# Patient Record
Sex: FEMALE | Race: WHITE | Employment: FULL TIME | URBAN - METROPOLITAN AREA
[De-identification: names, ages, dates, MRNs, and addresses within clinical notes are randomized per-mention and may not be internally consistent; named-entity substitution may affect disease eponyms.]

---

## 2024-09-04 ENCOUNTER — INITIAL PRENATAL (OUTPATIENT)
Dept: OBGYN CLINIC | Age: 39
End: 2024-09-04
Payer: COMMERCIAL

## 2024-09-04 ENCOUNTER — ANCILLARY PROCEDURE (OUTPATIENT)
Dept: OBGYN CLINIC | Age: 39
End: 2024-09-04
Payer: COMMERCIAL

## 2024-09-04 VITALS
HEIGHT: 66 IN | WEIGHT: 186 LBS | HEART RATE: 110 BPM | SYSTOLIC BLOOD PRESSURE: 117 MMHG | BODY MASS INDEX: 29.89 KG/M2 | DIASTOLIC BLOOD PRESSURE: 79 MMHG

## 2024-09-04 DIAGNOSIS — Z98.891 HX OF CESAREAN SECTION: ICD-10-CM

## 2024-09-04 DIAGNOSIS — O09.892 HX OF PRETERM DELIVERY, CURRENTLY PREGNANT, SECOND TRIMESTER: ICD-10-CM

## 2024-09-04 DIAGNOSIS — B18.2 CHRONIC HEPATITIS C WITHOUT HEPATIC COMA (HCC): ICD-10-CM

## 2024-09-04 DIAGNOSIS — F17.200 SMOKER: ICD-10-CM

## 2024-09-04 DIAGNOSIS — Z87.59 HISTORY OF PLACENTA ABRUPTION: ICD-10-CM

## 2024-09-04 DIAGNOSIS — O09.522 MULTIGRAVIDA OF ADVANCED MATERNAL AGE IN SECOND TRIMESTER: Primary | ICD-10-CM

## 2024-09-04 DIAGNOSIS — Z34.90 FIFTH PREGNANCY: ICD-10-CM

## 2024-09-04 PROBLEM — K81.0 ACUTE CHOLECYSTITIS: Status: RESOLVED | Noted: 2020-10-21 | Resolved: 2024-09-04

## 2024-09-04 LAB
GLUCOSE URINE, POC: NEGATIVE MG/DL
PROTEIN UA: NEGATIVE

## 2024-09-04 PROCEDURE — 81002 URINALYSIS NONAUTO W/O SCOPE: CPT | Performed by: OBSTETRICS & GYNECOLOGY

## 2024-09-04 PROCEDURE — 76805 OB US >/= 14 WKS SNGL FETUS: CPT | Performed by: OBSTETRICS & GYNECOLOGY

## 2024-09-04 PROCEDURE — 99204 OFFICE O/P NEW MOD 45 MIN: CPT | Performed by: OBSTETRICS & GYNECOLOGY

## 2024-09-04 PROCEDURE — 99203 OFFICE O/P NEW LOW 30 MIN: CPT | Performed by: OBSTETRICS & GYNECOLOGY

## 2024-09-04 RX ORDER — PHENOL 1.4 %
325 AEROSOL, SPRAY (ML) MUCOUS MEMBRANE DAILY
COMMUNITY
Start: 2024-08-13 | End: 2025-08-13

## 2024-09-04 NOTE — PROGRESS NOTES
Fiona Glass presents to office today for new patient consult.   Patient denies bleeding, LOF, or contractions.

## 2024-09-04 NOTE — PROGRESS NOTES
[facility] SPECIALTY CARE [facility] [place], [place] MATERNAL FETAL MEDICINE  [address] [place] [id number]  Dept: [phone]  Loc: [phone]     2024    RE:  Fiona Glass     : 1985   AGE: 39 y.o.     Dear Dr. Paulson,    Thank you for allowing me to provide prenatal consultation for Fiona Glass.  As I'm sure you will recall, Fiona Glass is a 39 y.o. [id number] LMP recorded (approximate). Patient is pregnant. Estimated Date of Delivery: 25 at 16w6d seen in our office today for the following:    REASON FOR CONSULTATION:  Patient Active Problem List    Diagnosis Date Noted    Multigravida of advanced maternal age in second trimester 2024    Fifth pregnancy 2024    Hx of  delivery, currently pregnant, second trimester 2024    Hx of  section 2024    Chronic hepatitis C without hepatic coma (HCC) 2024    Smoker 2024    History of placenta abruption 2024        PAST HISTORY:  OB History    Para Term  AB Living   5 2 1 1 2 2   SAB IAB Ectopic Molar Multiple Live Births   2       0 2      # Outcome Date GA Lbr Brayan/2nd Weight Sex Type Anes PTL Lv   5 Current            4  16 36w2d  2.381 kg (5 lb 4 oz) M CS-LTranv Spinal N FLORENCIA      Complications: Abruptio Placenta   3 Term 11 41w0d  4.678 kg (10 lb 5 oz) M CS-LTranv Spinal, EPI N FLORENCIA   2 SAB            1 SAB                   MEDICAL:  Past Medical History:   Diagnosis Date    Abnormal Pap smear of cervix 2024    Acute cholecystitis 10/21/2020    Depression     Infections of genitourinary tract antepartum 10/19/2011    Liveborn by  2016    Liver disease      delivery 2016    Trauma         SURGICAL:  Past Surgical History:   Procedure Laterality Date     SECTION  2011     SECTION  2016    CHOLECYSTECTOMY, LAPAROSCOPIC N/A 10/22/2020    CHOLECYSTECTOMY LAPAROSCOPIC WITH IOC

## 2024-09-04 NOTE — PATIENT INSTRUCTIONS
Patient Education        Weeks 14 to 18 of Your Pregnancy: Care Instructions  Around this time, you may start to look pregnant. Your baby is now able to pass urine. And the first stool (meconium) is starting to collect in your baby's intestines. Hair is starting to grow on your baby's head.    You may notice some skin changes, such as itchy spots on your palms or acne on your face.   At your next doctor visit, you may have an ultrasound. So you might think about whether you want to know the sex of your baby. Also ask your doctor about flu and COVID-19 shots.      How to reduce stress   Ask for help when you need it.  Try to avoid things that cause you stress.  Seek out things that relieve stress, such as breathing exercises or yoga.     How to get exercise   If you don't usually exercise, start slowly. Short walks may be a good choice.  Try to be active 30 minutes a day, at least 5 days a week.  Avoid activities where you're more likely to fall.  Use light weights to reduce stress on your joints.     How to stay at a healthy weight for you   Talk to your doctor or midwife about how much weight you should gain.  It's generally best to gain:  About 28 to 40 pounds if you're underweight.  About 25 to 35 pounds if you're at a healthy weight.  About 15 to 25 pounds if you're overweight.  About 11 to 20 pounds if you're very overweight (obese).  Follow-up care is a key part of your treatment and safety. Be sure to make and go to all appointments, and call your doctor if you are having problems. It's also a good idea to know your test results and keep a list of the medicines you take.  Where can you learn more?  Go to https://www.[employer].net/patientEd and enter I453 to learn more about \"Weeks 14 to 18 of Your Pregnancy: Care Instructions.\"  Current as of: July 10, 2023  Content Version: 14.1  © 0756-8722 [employer]wise, [employer].   Care instructions adapted under license by [employer]. If you have questions about a

## 2024-09-30 ENCOUNTER — ANCILLARY PROCEDURE (OUTPATIENT)
Dept: OBGYN CLINIC | Age: 39
End: 2024-09-30
Payer: COMMERCIAL

## 2024-09-30 ENCOUNTER — ROUTINE PRENATAL (OUTPATIENT)
Dept: OBGYN CLINIC | Age: 39
End: 2024-09-30
Payer: COMMERCIAL

## 2024-09-30 VITALS — WEIGHT: 185 LBS | SYSTOLIC BLOOD PRESSURE: 112 MMHG | DIASTOLIC BLOOD PRESSURE: 62 MMHG | BODY MASS INDEX: 29.86 KG/M2

## 2024-09-30 DIAGNOSIS — O44.02 PLACENTA PREVIA IN SECOND TRIMESTER: ICD-10-CM

## 2024-09-30 DIAGNOSIS — O09.522 MULTIGRAVIDA OF ADVANCED MATERNAL AGE IN SECOND TRIMESTER: Primary | ICD-10-CM

## 2024-09-30 DIAGNOSIS — Z3A.20 20 WEEKS GESTATION OF PREGNANCY: ICD-10-CM

## 2024-09-30 DIAGNOSIS — O09.892 HX OF PRETERM DELIVERY, CURRENTLY PREGNANT, SECOND TRIMESTER: ICD-10-CM

## 2024-09-30 LAB
GLUCOSE URINE, POC: NORMAL MG/DL
PROTEIN UA: NEGATIVE

## 2024-09-30 PROCEDURE — 99213 OFFICE O/P EST LOW 20 MIN: CPT | Performed by: OBSTETRICS & GYNECOLOGY

## 2024-09-30 PROCEDURE — 81002 URINALYSIS NONAUTO W/O SCOPE: CPT | Performed by: OBSTETRICS & GYNECOLOGY

## 2024-09-30 PROCEDURE — 76811 OB US DETAILED SNGL FETUS: CPT | Performed by: OBSTETRICS & GYNECOLOGY

## 2024-09-30 PROCEDURE — 76817 TRANSVAGINAL US OBSTETRIC: CPT | Performed by: OBSTETRICS & GYNECOLOGY

## 2024-09-30 NOTE — PROGRESS NOTES
Patient is here today for 4 wk f/u. Denies any vaginal bleeding, or leakage of fluids.  Cramping on and off and lower back pain.   Patient reports good fetal movement.     
10/22/2020    CHOLECYSTECTOMY LAPAROSCOPIC WITH IOC performed by Arleen Hairston MD at [facility] OR       ALLERGIES:    No Known Allergies      MEDICATIONS:    Current Outpatient Medications   Medication Sig Dispense Refill    Ferrous Sulfate (IRON HIGH-POTENCY) 325 MG TABS Take 325 mg by mouth daily      Prenatal Vit w/Xm-Yztzjcgwj-RI (PNV PO) Take 1 tablet by mouth daily      ibuprofen (ADVIL;MOTRIN) 600 MG tablet Take 1 tablet by mouth 4 times daily as needed for Pain (Patient not taking: Reported on 9/4/2024) 40 tablet 0    magnesium citrate solution Take 296 mLs by mouth once for 1 dose 296 mL 0    ondansetron (ZOFRAN-ODT) 4 MG disintegrating tablet Take 1 tablet by mouth every 8 hours as needed for Nausea or Vomiting (Patient not taking: Reported on 9/4/2024) 20 tablet 1     No current facility-administered medications for this visit.        Social History     Socioeconomic History    Marital status:    Tobacco Use    Smoking status: Every Day     Current packs/day: 1.00     Average packs/day: 1 pack/day for 9.0 years (9.0 ttl pk-yrs)     Types: Cigarettes    Smokeless tobacco: Never   Vaping Use    Vaping status: Never Used   Substance and Sexual Activity    Alcohol use: No    Drug use: No    Sexual activity: Yes     Partners: Male          FAMILY MEDICAL HISTORY:   Family History   Problem Relation Age of Onset    Hypertension Mother     Diabetes Mother     Asthma Mother           PHYSICAL EXAMINATION:  /62   Wt 83.9 kg (185 lb)   LMP  (Approximate)   Body mass index is 29.86 kg/m².    Urine dipstick:  Results for orders placed or performed in visit on 09/30/24   POCT urine qual dipstick protein   Result Value Ref Range    Protein, UA Negative Negative   POCT urine qual dipstick glucose   Result Value Ref Range    Glucose, UA POC neg mg/dL        A/an Level II ultrasound was done in our office today. Please refer to the enclosed copy of the ultrasound report for further

## 2024-10-02 ENCOUNTER — HOSPITAL ENCOUNTER (OUTPATIENT)
Age: 39
Discharge: HOME | End: 2024-10-02
Attending: OBSTETRICS & GYNECOLOGY | Admitting: OBSTETRICS & GYNECOLOGY
Payer: COMMERCIAL

## 2024-10-02 VITALS
HEIGHT: 66 IN | WEIGHT: 186 LBS | RESPIRATION RATE: 16 BRPM | TEMPERATURE: 98 F | DIASTOLIC BLOOD PRESSURE: 65 MMHG | SYSTOLIC BLOOD PRESSURE: 117 MMHG | OXYGEN SATURATION: 99 % | HEART RATE: 97 BPM | BODY MASS INDEX: 29.89 KG/M2

## 2024-10-02 LAB
BACTERIA URNS QL MICRO: ABNORMAL
BILIRUB UR QL STRIP: NEGATIVE
CLARITY UR: ABNORMAL
COLOR UR: YELLOW
EPI CELLS #/AREA URNS HPF: ABNORMAL /HPF
GLUCOSE UR STRIP-MCNC: NEGATIVE MG/DL
HGB UR QL STRIP.AUTO: ABNORMAL
KETONES UR STRIP-MCNC: NEGATIVE MG/DL
LEUKOCYTE ESTERASE UR QL STRIP: ABNORMAL
NITRITE UR QL STRIP: POSITIVE
PH UR STRIP: 6 [PH] (ref 5–9)
PROT UR STRIP-MCNC: NEGATIVE MG/DL
RBC #/AREA URNS HPF: ABNORMAL /HPF
SP GR UR STRIP: 1.01 (ref 1–1.03)
UROBILINOGEN UR STRIP-ACNC: 0.2 EU/DL (ref 0–1)
WBC #/AREA URNS HPF: ABNORMAL /HPF

## 2024-10-02 PROCEDURE — 81001 URINALYSIS AUTO W/SCOPE: CPT

## 2024-10-02 PROCEDURE — 99202 OFFICE O/P NEW SF 15 MIN: CPT

## 2024-10-02 RX ORDER — CEPHALEXIN 500 MG/1
500 CAPSULE ORAL
Status: DISCONTINUED | OUTPATIENT
Start: 2024-10-02 | End: 2024-10-02 | Stop reason: HOSPADM

## 2024-10-02 NOTE — PROGRESS NOTES
Patient arrived with pain over her bladder and left flank pain.Fetal heart is 155.Denies bleeding or leaking.DR Paulson notified of symptoms-orders received

## 2024-10-16 ENCOUNTER — APPOINTMENT (OUTPATIENT)
Dept: GENERAL RADIOLOGY | Age: 39
End: 2024-10-16
Payer: COMMERCIAL

## 2024-10-16 ENCOUNTER — HOSPITAL ENCOUNTER (EMERGENCY)
Age: 39
Discharge: HOME | End: 2024-10-16
Attending: STUDENT IN AN ORGANIZED HEALTH CARE EDUCATION/TRAINING PROGRAM
Payer: COMMERCIAL

## 2024-10-16 VITALS
DIASTOLIC BLOOD PRESSURE: 45 MMHG | RESPIRATION RATE: 18 BRPM | HEART RATE: 85 BPM | SYSTOLIC BLOOD PRESSURE: 109 MMHG | TEMPERATURE: 98.2 F | OXYGEN SATURATION: 98 %

## 2024-10-16 DIAGNOSIS — N30.01 ACUTE CYSTITIS WITH HEMATURIA: Primary | ICD-10-CM

## 2024-10-16 DIAGNOSIS — Z3A.22 22 WEEKS GESTATION OF PREGNANCY: ICD-10-CM

## 2024-10-16 DIAGNOSIS — E86.0 DEHYDRATION: ICD-10-CM

## 2024-10-16 LAB
ALBUMIN SERPL-MCNC: 3.3 G/DL (ref 3.5–5.2)
ALP SERPL-CCNC: 88 U/L (ref 35–104)
ALT SERPL-CCNC: 23 U/L (ref 0–32)
ANION GAP SERPL CALCULATED.3IONS-SCNC: 11 MMOL/L (ref 7–16)
AST SERPL-CCNC: 19 U/L (ref 0–31)
BACTERIA URNS QL MICRO: ABNORMAL
BASOPHILS # BLD: 0 K/UL (ref 0–0.2)
BASOPHILS NFR BLD: 0 % (ref 0–2)
BILIRUB SERPL-MCNC: 0.3 MG/DL (ref 0–1.2)
BILIRUB UR QL STRIP: NEGATIVE
BUN SERPL-MCNC: 5 MG/DL (ref 6–20)
CALCIUM SERPL-MCNC: 8.7 MG/DL (ref 8.6–10.2)
CHLORIDE SERPL-SCNC: 99 MMOL/L (ref 98–107)
CLARITY UR: ABNORMAL
CO2 SERPL-SCNC: 21 MMOL/L (ref 22–29)
COLOR UR: YELLOW
CREAT SERPL-MCNC: 0.5 MG/DL (ref 0.5–1)
EOSINOPHIL # BLD: 0 K/UL (ref 0.05–0.5)
EOSINOPHILS RELATIVE PERCENT: 0 % (ref 0–6)
ERYTHROCYTE [DISTWIDTH] IN BLOOD BY AUTOMATED COUNT: 23.7 % (ref 11.5–15)
FLUAV RNA RESP QL NAA+PROBE: NOT DETECTED
FLUBV RNA RESP QL NAA+PROBE: NOT DETECTED
GFR, ESTIMATED: >90 ML/MIN/1.73M2
GLUCOSE SERPL-MCNC: 107 MG/DL (ref 74–99)
GLUCOSE UR STRIP-MCNC: NEGATIVE MG/DL
HCT VFR BLD AUTO: 34.4 % (ref 34–48)
HGB BLD-MCNC: 11.2 G/DL (ref 11.5–15.5)
HGB UR QL STRIP.AUTO: ABNORMAL
KETONES UR STRIP-MCNC: NEGATIVE MG/DL
LEUKOCYTE ESTERASE UR QL STRIP: ABNORMAL
LYMPHOCYTES NFR BLD: 0.83 K/UL (ref 1.5–4)
LYMPHOCYTES RELATIVE PERCENT: 8 % (ref 20–42)
MCH RBC QN AUTO: 25.5 PG (ref 26–35)
MCHC RBC AUTO-ENTMCNC: 32.6 G/DL (ref 32–34.5)
MCV RBC AUTO: 78.4 FL (ref 80–99.9)
MONOCYTES NFR BLD: 0.09 K/UL (ref 0.1–0.95)
MONOCYTES NFR BLD: 1 % (ref 2–12)
NEUTROPHILS NFR BLD: 91 % (ref 43–80)
NEUTS SEG NFR BLD: 9.68 K/UL (ref 1.8–7.3)
NITRITE UR QL STRIP: POSITIVE
PH UR STRIP: 6.5 [PH] (ref 5–9)
PLATELET # BLD AUTO: 188 K/UL (ref 130–450)
PMV BLD AUTO: 10.2 FL (ref 7–12)
POTASSIUM SERPL-SCNC: 3.7 MMOL/L (ref 3.5–5)
PROT SERPL-MCNC: 6.7 G/DL (ref 6.4–8.3)
PROT UR STRIP-MCNC: NEGATIVE MG/DL
RBC # BLD AUTO: 4.39 M/UL (ref 3.5–5.5)
RBC # BLD: ABNORMAL 10*6/UL
RBC #/AREA URNS HPF: ABNORMAL /HPF
SARS-COV-2 RNA RESP QL NAA+PROBE: NOT DETECTED
SODIUM SERPL-SCNC: 131 MMOL/L (ref 132–146)
SOURCE: NORMAL
SP GR UR STRIP: <1.005 (ref 1–1.03)
SPECIMEN DESCRIPTION: NORMAL
UROBILINOGEN UR STRIP-ACNC: 1 EU/DL (ref 0–1)
WBC #/AREA URNS HPF: ABNORMAL /HPF
WBC OTHER # BLD: 10.6 K/UL (ref 4.5–11.5)

## 2024-10-16 PROCEDURE — 80053 COMPREHEN METABOLIC PANEL: CPT

## 2024-10-16 PROCEDURE — 99284 EMERGENCY DEPT VISIT MOD MDM: CPT

## 2024-10-16 PROCEDURE — 87086 URINE CULTURE/COLONY COUNT: CPT

## 2024-10-16 PROCEDURE — 2580000003 HC RX 258: Performed by: STUDENT IN AN ORGANIZED HEALTH CARE EDUCATION/TRAINING PROGRAM

## 2024-10-16 PROCEDURE — 81001 URINALYSIS AUTO W/SCOPE: CPT

## 2024-10-16 PROCEDURE — 6370000000 HC RX 637 (ALT 250 FOR IP): Performed by: STUDENT IN AN ORGANIZED HEALTH CARE EDUCATION/TRAINING PROGRAM

## 2024-10-16 PROCEDURE — 6360000002 HC RX W HCPCS: Performed by: STUDENT IN AN ORGANIZED HEALTH CARE EDUCATION/TRAINING PROGRAM

## 2024-10-16 PROCEDURE — 96375 TX/PRO/DX INJ NEW DRUG ADDON: CPT

## 2024-10-16 PROCEDURE — 87077 CULTURE AEROBIC IDENTIFY: CPT

## 2024-10-16 PROCEDURE — 96374 THER/PROPH/DIAG INJ IV PUSH: CPT

## 2024-10-16 PROCEDURE — 71046 X-RAY EXAM CHEST 2 VIEWS: CPT

## 2024-10-16 PROCEDURE — 87636 SARSCOV2 & INF A&B AMP PRB: CPT

## 2024-10-16 PROCEDURE — 85025 COMPLETE CBC W/AUTO DIFF WBC: CPT

## 2024-10-16 PROCEDURE — 96361 HYDRATE IV INFUSION ADD-ON: CPT

## 2024-10-16 RX ORDER — ACETAMINOPHEN 500 MG
1000 TABLET ORAL ONCE
Status: COMPLETED | OUTPATIENT
Start: 2024-10-16 | End: 2024-10-16

## 2024-10-16 RX ORDER — 0.9 % SODIUM CHLORIDE 0.9 %
1000 INTRAVENOUS SOLUTION INTRAVENOUS ONCE
Status: COMPLETED | OUTPATIENT
Start: 2024-10-16 | End: 2024-10-16

## 2024-10-16 RX ORDER — CEFDINIR 300 MG/1
300 CAPSULE ORAL 2 TIMES DAILY
Qty: 14 CAPSULE | Refills: 0 | Status: SHIPPED | OUTPATIENT
Start: 2024-10-16 | End: 2024-10-23

## 2024-10-16 RX ORDER — ONDANSETRON 2 MG/ML
4 INJECTION INTRAMUSCULAR; INTRAVENOUS ONCE
Status: COMPLETED | OUTPATIENT
Start: 2024-10-16 | End: 2024-10-16

## 2024-10-16 RX ADMIN — WATER 2000 MG: 1 INJECTION INTRAMUSCULAR; INTRAVENOUS; SUBCUTANEOUS at 22:24

## 2024-10-16 RX ADMIN — ACETAMINOPHEN 1000 MG: 500 TABLET ORAL at 21:28

## 2024-10-16 RX ADMIN — SODIUM CHLORIDE 1000 ML: 9 INJECTION, SOLUTION INTRAVENOUS at 21:31

## 2024-10-16 RX ADMIN — ONDANSETRON 4 MG: 2 INJECTION, SOLUTION INTRAMUSCULAR; INTRAVENOUS at 21:28

## 2024-10-16 ASSESSMENT — PAIN - FUNCTIONAL ASSESSMENT
PAIN_FUNCTIONAL_ASSESSMENT: NONE - DENIES PAIN

## 2024-10-17 NOTE — ED PROVIDER NOTES
[facility] EMERGENCY DEPARTMENT  EMERGENCY DEPARTMENT ENCOUNTER        Pt Name: Fiona Glass  MRN: [id number]  Birthdate 1985  Date of evaluation: 10/16/2024  Provider: Ida Sales DO  PCP: David Pinzon Jr., DO  Note Started: 9:08 PM EDT 10/16/24    CHIEF COMPLAINT       Chief Complaint   Patient presents with    Fever     Pt states that it stated yesterday, called doctor and got told to come in 22wks pregnant        HISTORY OF PRESENT ILLNESS: 1 or more Elements   History From: patient    Limitations to history : None    Fiona Glass is a 39 y.o. female  currently 22 weeks 6 days pregnant presenting to the emergency department complaining of a fever.  PTSD she does she had a fever and she called her OB/GYN, Dr. Paulson, who recommended her to come in today for evaluation.  Patient states that she has had some urinary symptoms.  She states that sometimes when she coughs she is incontinent of urine.  She does complain of some mild upper respiratory congestion and a dry cough at times.  She has had good fetal movement.  Patient does complain of some nausea and states that she has had trouble keeping stuff down.  Patient denies any chills, chest pain, shortness of breath, abdominal pain, vomiting, diarrhea, vaginal bleeding, hematuria, leakage of fluid, sick contacts, recent hospitalization, recent illness, or other acute symptoms or concerns.  Patient states that she has had no issues with this pregnancy thus far.    Nursing Notes were all reviewed and agreed with or any disagreements were addressed in the HPI.    REVIEW OF SYSTEMS :      Review of Systems    Positives and Pertinent negatives as per HPI.     SURGICAL HISTORY     Past Surgical History:   Procedure Laterality Date     SECTION  2011     SECTION  2016    CHOLECYSTECTOMY, LAPAROSCOPIC N/A 10/22/2020    CHOLECYSTECTOMY LAPAROSCOPIC WITH IOC performed by Arleen Hairston MD at

## 2024-10-17 NOTE — DISCHARGE INSTRUCTIONS
Follow-up with OB/GYN.  Return for any significant worsening symptoms or other acute symptoms or concerns.  Complete course of antibiotics.

## 2024-10-19 LAB
MICROORGANISM SPEC CULT: ABNORMAL
SERVICE CMNT-IMP: ABNORMAL
SPECIMEN DESCRIPTION: ABNORMAL

## 2024-10-19 NOTE — ED NOTES
Reviewed patients after hours culture results.   Urine culture growing ESCHERICHIA COLI, patient discharged on cefdinir.   No need for further intervention at this time.    Jocelyn Kat PharmD, BCPS 10/19/2024 11:58 AM   [phone]

## 2024-10-28 ENCOUNTER — ANCILLARY PROCEDURE (OUTPATIENT)
Dept: OBGYN CLINIC | Age: 39
End: 2024-10-28
Payer: COMMERCIAL

## 2024-10-28 ENCOUNTER — ROUTINE PRENATAL (OUTPATIENT)
Dept: OBGYN CLINIC | Age: 39
End: 2024-10-28
Payer: COMMERCIAL

## 2024-10-28 VITALS
BODY MASS INDEX: 30.34 KG/M2 | HEART RATE: 96 BPM | WEIGHT: 188 LBS | SYSTOLIC BLOOD PRESSURE: 116 MMHG | DIASTOLIC BLOOD PRESSURE: 75 MMHG

## 2024-10-28 DIAGNOSIS — Z36.9 ENCOUNTER FOR FETAL ULTRASOUND: ICD-10-CM

## 2024-10-28 DIAGNOSIS — O09.522 MULTIGRAVIDA OF ADVANCED MATERNAL AGE IN SECOND TRIMESTER: Primary | ICD-10-CM

## 2024-10-28 PROBLEM — Z3A.24 24 WEEKS GESTATION OF PREGNANCY: Status: ACTIVE | Noted: 2024-09-30

## 2024-10-28 LAB
GLUCOSE URINE, POC: NORMAL MG/DL
PROTEIN UA: NEGATIVE

## 2024-10-28 PROCEDURE — 99999 PR OFFICE/OUTPT VISIT,PROCEDURE ONLY: CPT | Performed by: OBSTETRICS & GYNECOLOGY

## 2024-10-28 PROCEDURE — 81002 URINALYSIS NONAUTO W/O SCOPE: CPT | Performed by: OBSTETRICS & GYNECOLOGY

## 2024-10-28 PROCEDURE — 76816 OB US FOLLOW-UP PER FETUS: CPT | Performed by: OBSTETRICS & GYNECOLOGY

## 2024-10-28 PROCEDURE — 99213 OFFICE O/P EST LOW 20 MIN: CPT | Performed by: OBSTETRICS & GYNECOLOGY

## 2024-10-28 NOTE — PROGRESS NOTES
[facility] [place] MATERNAL FETAL MEDICINE  [address] [place] [address]  [place] [address]  Dept: [phone]  Loc: [phone]     10/28/2024    RE:  Fiona HETAL Rojasn     : 1985   AGE: 39 y.o.     Dear Dr. Paulsno,    Thank you for allowing me to see Fionawai Glass.  As I'm sure you will recall, Fiona Glass is a 39 y.o. [id number] LMP recorded (approximate). Patient is pregnant. Estimated Date of Delivery: 25 at 24w4d seen in our office today for the following:    REASON FOR VISIT: Growth     Patient Active Problem List    Diagnosis Date Noted    24 weeks gestation of pregnancy 2024    Placenta previa in second trimester 2024    Multigravida of advanced maternal age in second trimester 2024    Fifth pregnancy 2024    Hx of  delivery, currently pregnant, second trimester 2024    Hx of  section 2024    Chronic hepatitis C without hepatic coma (HCC) 2024    Smoker 2024    History of placenta abruption 2024        PAST HISTORY:  OB History    Para Term  AB Living   5 2 1 1 2 2   SAB IAB Ectopic Molar Multiple Live Births   2       0 2      # Outcome Date GA Lbr Brayan/2nd Weight Sex Type Anes PTL Lv   5 Current            4  16 36w2d  2.381 kg (5 lb 4 oz) M CS-LTranv Spinal N FLORENCIA      Complications: Abruptio Placenta   3 Term 11 41w0d  4.678 kg (10 lb 5 oz) M CS-LTranv Spinal, EPI N FLORENCIA   2 SAB            1 SAB                   MEDICAL:  Past Medical History:   Diagnosis Date    Abnormal Pap smear of cervix 2024    Acute cholecystitis 10/21/2020    Depression     Infections of genitourinary tract antepartum 10/19/2011    Liveborn by  2016    Liver disease      delivery 2016    Trauma         SURGICAL:  Past Surgical History:   Procedure Laterality Date     SECTION  2011     SECTION  2016    CHOLECYSTECTOMY, LAPAROSCOPIC N/A

## 2024-10-28 NOTE — PROGRESS NOTES
Patient is here today for 4 wk f/u. Denies any vaginal bleeding, cramping, or leakage of fluids.  Had a fever on 10/2 and went to L&D was told it was UTI.  Patient reports good fetal movement.

## 2024-11-14 LAB
GLUCOSE, 1HR PP: 135 MG/DL
HCT VFR BLD CALC: 36 % (ref 35–45)
HEMOGLOBIN: 11.4 G/DL (ref 11.7–15.5)

## 2024-11-25 ENCOUNTER — ROUTINE PRENATAL (OUTPATIENT)
Dept: OBGYN CLINIC | Age: 39
End: 2024-11-25
Payer: COMMERCIAL

## 2024-11-25 ENCOUNTER — ANCILLARY PROCEDURE (OUTPATIENT)
Dept: OBGYN CLINIC | Age: 39
End: 2024-11-25
Payer: COMMERCIAL

## 2024-11-25 VITALS
DIASTOLIC BLOOD PRESSURE: 75 MMHG | RESPIRATION RATE: 14 BRPM | OXYGEN SATURATION: 100 % | HEART RATE: 103 BPM | TEMPERATURE: 98.2 F | WEIGHT: 184 LBS | BODY MASS INDEX: 29.7 KG/M2 | SYSTOLIC BLOOD PRESSURE: 117 MMHG

## 2024-11-25 DIAGNOSIS — Z87.59 HISTORY OF PLACENTA ABRUPTION: ICD-10-CM

## 2024-11-25 DIAGNOSIS — O09.892 HX OF PRETERM DELIVERY, CURRENTLY PREGNANT, SECOND TRIMESTER: Primary | ICD-10-CM

## 2024-11-25 DIAGNOSIS — Z98.891 HX OF CESAREAN SECTION: ICD-10-CM

## 2024-11-25 PROBLEM — Z3A.28 28 WEEKS GESTATION OF PREGNANCY: Status: ACTIVE | Noted: 2024-09-30

## 2024-11-25 PROBLEM — O44.02 PLACENTA PREVIA IN SECOND TRIMESTER: Status: RESOLVED | Noted: 2024-09-30 | Resolved: 2024-11-25

## 2024-11-25 LAB
GLUCOSE URINE, POC: NORMAL MG/DL
PROTEIN UA: POSITIVE

## 2024-11-25 PROCEDURE — 76805 OB US >/= 14 WKS SNGL FETUS: CPT | Performed by: OBSTETRICS & GYNECOLOGY

## 2024-11-25 PROCEDURE — 99999 PR OFFICE/OUTPT VISIT,PROCEDURE ONLY: CPT | Performed by: OBSTETRICS & GYNECOLOGY

## 2024-11-25 PROCEDURE — 99213 OFFICE O/P EST LOW 20 MIN: CPT | Performed by: OBSTETRICS & GYNECOLOGY

## 2024-11-25 PROCEDURE — 81002 URINALYSIS NONAUTO W/O SCOPE: CPT | Performed by: OBSTETRICS & GYNECOLOGY

## 2024-11-25 NOTE — PROGRESS NOTES
[facility] [place] MATERNAL FETAL MEDICINE  [address] [place], [place] [address] [address]  Dept: [phone]  Loc: [phone]     2024    RE:  Fionawai Glass     : 1985   AGE: 39 y.o.     Dear Dr. Paulson,    Thank you for allowing me to see Fiona Glass.  As I'm sure you will recall, Fiona Glass is a 39 y.o. [id number] LMP recorded (approximate). Patient is pregnant. Estimated Date of Delivery: 25 at 28w4d seen in our office today for the following:    REASON FOR VISIT: Growth     Patient Active Problem List    Diagnosis Date Noted    28 weeks gestation of pregnancy 2024    Multigravida of advanced maternal age in second trimester 2024    Fifth pregnancy 2024    Hx of  delivery, currently pregnant, second trimester 2024    Hx of  section 2024    Chronic hepatitis C without hepatic coma (HCC) 2024    Smoker 2024    History of placenta abruption 2024        PAST HISTORY:  OB History    Para Term  AB Living   5 2 1 1 2 2   SAB IAB Ectopic Molar Multiple Live Births   2       0 2      # Outcome Date GA Lbr Brayan/2nd Weight Sex Type Anes PTL Lv   5 Current            4  16 36w2d  2.381 kg (5 lb 4 oz) M CS-LTranv Spinal N FLORENCIA      Complications: Abruptio Placenta   3 Term 11 41w0d  4.678 kg (10 lb 5 oz) M CS-LTranv Spinal, EPI N FLORENCIA   2 SAB            1 SAB                   MEDICAL:  Past Medical History:   Diagnosis Date    Abnormal Pap smear of cervix 2024    Acute cholecystitis 10/21/2020    Depression     Infections of genitourinary tract antepartum 10/19/2011    Liveborn by  2016    Liver disease      delivery 2016    Trauma         SURGICAL:  Past Surgical History:   Procedure Laterality Date     SECTION  2011     SECTION  2016    CHOLECYSTECTOMY, LAPAROSCOPIC N/A 10/22/2020    CHOLECYSTECTOMY LAPAROSCOPIC WITH IOC

## 2024-11-25 NOTE — PROGRESS NOTES
Patient is here today for 4 wk f/u. Denies any vaginal bleeding, or leakage of fluids.  Cramping and right side cramps. Leg cramps.  Patient reports good fetal movement.

## 2024-12-24 ENCOUNTER — ANCILLARY PROCEDURE (OUTPATIENT)
Dept: OBGYN CLINIC | Age: 39
End: 2024-12-24
Payer: COMMERCIAL

## 2024-12-24 ENCOUNTER — ROUTINE PRENATAL (OUTPATIENT)
Dept: OBGYN CLINIC | Age: 39
End: 2024-12-24
Payer: COMMERCIAL

## 2024-12-24 VITALS
BODY MASS INDEX: 29.7 KG/M2 | WEIGHT: 184 LBS | SYSTOLIC BLOOD PRESSURE: 110 MMHG | TEMPERATURE: 97.7 F | HEART RATE: 103 BPM | RESPIRATION RATE: 14 BRPM | DIASTOLIC BLOOD PRESSURE: 77 MMHG | OXYGEN SATURATION: 100 %

## 2024-12-24 DIAGNOSIS — B18.2 CHRONIC HEPATITIS C WITHOUT HEPATIC COMA (HCC): ICD-10-CM

## 2024-12-24 DIAGNOSIS — Z87.59 HISTORY OF PLACENTA ABRUPTION: ICD-10-CM

## 2024-12-24 DIAGNOSIS — O09.892 HX OF PRETERM DELIVERY, CURRENTLY PREGNANT, SECOND TRIMESTER: Primary | ICD-10-CM

## 2024-12-24 PROBLEM — Z3A.32 32 WEEKS GESTATION OF PREGNANCY: Status: ACTIVE | Noted: 2024-09-30

## 2024-12-24 LAB
GLUCOSE URINE, POC: NORMAL MG/DL
PROTEIN UA: NEGATIVE

## 2024-12-24 PROCEDURE — 99213 OFFICE O/P EST LOW 20 MIN: CPT | Performed by: OBSTETRICS & GYNECOLOGY

## 2024-12-24 PROCEDURE — 76816 OB US FOLLOW-UP PER FETUS: CPT | Performed by: OBSTETRICS & GYNECOLOGY

## 2024-12-24 PROCEDURE — 81002 URINALYSIS NONAUTO W/O SCOPE: CPT | Performed by: OBSTETRICS & GYNECOLOGY

## 2024-12-24 PROCEDURE — 99999 PR OFFICE/OUTPT VISIT,PROCEDURE ONLY: CPT | Performed by: OBSTETRICS & GYNECOLOGY

## 2024-12-24 NOTE — PROGRESS NOTES
Patient is here today for 4 wk f/u. Denies any vaginal bleeding,  or leakage of fluids.  Having cramping on and off.  Patient reports good fetal movement.

## 2024-12-24 NOTE — PROGRESS NOTES
[facility] [place] MATERNAL FETAL MEDICINE  [address] [place], [place] [address] [address]  Dept: [phone]  Loc: [phone]     2024    RE:  Fiona HETAL Rojasn     : 1985   AGE: 39 y.o.     Dear Dr. Paulson,    Thank you for allowing me to see Fiona Glass.  As I'm sure you will recall, Fiona Glass is a 39 y.o. [id number] LMP recorded (approximate). Patient is pregnant. Estimated Date of Delivery: 25 at 32w5d seen in our office today for the following:    REASON FOR VISIT: Growth     Patient Active Problem List    Diagnosis Date Noted    32 weeks gestation of pregnancy 2024    Multigravida of advanced maternal age in second trimester 2024    Fifth pregnancy 2024    Hx of  delivery, currently pregnant, second trimester 2024    Hx of  section 2024    Chronic hepatitis C without hepatic coma (HCC) 2024    Smoker 2024    History of placenta abruption 2024        PAST HISTORY:  OB History    Para Term  AB Living   5 2 1 1 2 2   SAB IAB Ectopic Molar Multiple Live Births   2       0 2      # Outcome Date GA Lbr Brayan/2nd Weight Sex Type Anes PTL Lv   5 Current            4  16 36w2d  2.381 kg (5 lb 4 oz) M CS-LTranv Spinal N FLORENCIA      Complications: Abruptio Placenta   3 Term 11 41w0d  4.678 kg (10 lb 5 oz) M CS-LTranv Spinal, EPI N FLORENCIA   2 SAB            1 SAB                   MEDICAL:  Past Medical History:   Diagnosis Date    Abnormal Pap smear of cervix 2024    Acute cholecystitis 10/21/2020    Depression     Infections of genitourinary tract antepartum 10/19/2011    Liveborn by  2016    Liver disease      delivery 2016    Trauma         SURGICAL:  Past Surgical History:   Procedure Laterality Date     SECTION  2011     SECTION  2016    CHOLECYSTECTOMY, LAPAROSCOPIC N/A 10/22/2020    CHOLECYSTECTOMY LAPAROSCOPIC WITH IOC

## 2025-01-04 ENCOUNTER — HOSPITAL ENCOUNTER (OUTPATIENT)
Age: 40
Discharge: HOME | End: 2025-01-04
Attending: OBSTETRICS & GYNECOLOGY | Admitting: OBSTETRICS & GYNECOLOGY
Payer: COMMERCIAL

## 2025-01-04 VITALS
RESPIRATION RATE: 18 BRPM | TEMPERATURE: 97.6 F | OXYGEN SATURATION: 100 % | DIASTOLIC BLOOD PRESSURE: 63 MMHG | SYSTOLIC BLOOD PRESSURE: 126 MMHG | HEART RATE: 86 BPM

## 2025-01-04 PROBLEM — Z3A.34 34 WEEKS GESTATION OF PREGNANCY: Status: ACTIVE | Noted: 2025-01-04

## 2025-01-04 LAB
BACTERIA URNS QL MICRO: ABNORMAL
BILIRUB UR QL STRIP: NEGATIVE
CLARITY UR: ABNORMAL
COLOR UR: YELLOW
GLUCOSE UR STRIP-MCNC: NEGATIVE MG/DL
HGB UR QL STRIP.AUTO: NEGATIVE
KETONES UR STRIP-MCNC: NEGATIVE MG/DL
LEUKOCYTE ESTERASE UR QL STRIP: ABNORMAL
NITRITE UR QL STRIP: POSITIVE
PH UR STRIP: 7 [PH] (ref 5–9)
PROT UR STRIP-MCNC: NEGATIVE MG/DL
RBC #/AREA URNS HPF: ABNORMAL /HPF
SP GR UR STRIP: 1.02 (ref 1–1.03)
UROBILINOGEN UR STRIP-ACNC: 1 EU/DL (ref 0–1)
WBC #/AREA URNS HPF: ABNORMAL /HPF

## 2025-01-04 PROCEDURE — 87086 URINE CULTURE/COLONY COUNT: CPT

## 2025-01-04 PROCEDURE — 6370000000 HC RX 637 (ALT 250 FOR IP): Performed by: OBSTETRICS & GYNECOLOGY

## 2025-01-04 PROCEDURE — 87077 CULTURE AEROBIC IDENTIFY: CPT

## 2025-01-04 PROCEDURE — 99211 OFF/OP EST MAY X REQ PHY/QHP: CPT

## 2025-01-04 PROCEDURE — 81001 URINALYSIS AUTO W/SCOPE: CPT

## 2025-01-04 RX ORDER — CEPHALEXIN 500 MG/1
500 CAPSULE ORAL EVERY 6 HOURS SCHEDULED
Status: COMPLETED | OUTPATIENT
Start: 2025-01-04 | End: 2025-01-04

## 2025-01-04 RX ADMIN — CEPHALEXIN 500 MG: 500 CAPSULE ORAL at 18:57

## 2025-01-04 NOTE — PROGRESS NOTES
Called Dr. Paulson regarding pt complaints and arrival to the unit. Order to send urine and call him back with results.

## 2025-01-04 NOTE — PROGRESS NOTES
34 wk 2d pt arrived to the unit per Dr. Paulson for abdominal and back pain. Pt denies vaginal bleeding, leaking of fluid and contractions. Pt perceives fetal movement and states baby moving is causing her pain. EFM on and VS obtained.

## 2025-01-05 NOTE — PROGRESS NOTES
Gave pt dose of keflex, collected urine culture, gave discharge instructions. Pt verbalized understanding without any questions.

## 2025-01-05 NOTE — PROGRESS NOTES
Updated Dr. Paulson on pts UA results. Order for one dose of keflex and collect urine culture then discharge pt. He is sending antibiotic to her pharmacy for  tomorrow.

## 2025-01-07 LAB
MICROORGANISM SPEC CULT: ABNORMAL
SERVICE CMNT-IMP: ABNORMAL
SPECIMEN DESCRIPTION: ABNORMAL

## 2025-01-10 LAB
CHLAMYDIA, NUC. ACID AMP: NOT DETECTED
COMMENT: NORMAL
N GONORRHOEAE AMPLIFIED DET: NOT DETECTED
TRICHOMONAS VAGINALIS RRNA: NOT DETECTED

## 2025-01-13 LAB
HB: SOURCE: NORMAL
STREP GROUP B NAA: NOT DETECTED

## 2025-01-21 ENCOUNTER — TELEPHONE (OUTPATIENT)
Dept: OBGYN CLINIC | Age: 40
End: 2025-01-21

## 2025-01-21 ENCOUNTER — ANCILLARY PROCEDURE (OUTPATIENT)
Dept: OBGYN CLINIC | Age: 40
End: 2025-01-21
Payer: COMMERCIAL

## 2025-01-21 ENCOUNTER — ROUTINE PRENATAL (OUTPATIENT)
Dept: OBGYN CLINIC | Age: 40
End: 2025-01-21
Payer: COMMERCIAL

## 2025-01-21 VITALS
TEMPERATURE: 97.3 F | SYSTOLIC BLOOD PRESSURE: 128 MMHG | HEART RATE: 91 BPM | DIASTOLIC BLOOD PRESSURE: 85 MMHG | BODY MASS INDEX: 29.38 KG/M2 | WEIGHT: 182 LBS | RESPIRATION RATE: 14 BRPM

## 2025-01-21 DIAGNOSIS — O09.523 ADVANCED MATERNAL AGE IN MULTIGRAVIDA, THIRD TRIMESTER: ICD-10-CM

## 2025-01-21 DIAGNOSIS — O09.893 HX OF PRETERM DELIVERY, CURRENTLY PREGNANT, THIRD TRIMESTER: Primary | ICD-10-CM

## 2025-01-21 DIAGNOSIS — B18.2 CHRONIC HEPATITIS C WITHOUT HEPATIC COMA (HCC): ICD-10-CM

## 2025-01-21 DIAGNOSIS — Z98.891 HX OF CESAREAN SECTION: ICD-10-CM

## 2025-01-21 DIAGNOSIS — Z87.59 HISTORY OF PLACENTA ABRUPTION: ICD-10-CM

## 2025-01-21 DIAGNOSIS — O09.523 MULTIGRAVIDA OF ADVANCED MATERNAL AGE IN THIRD TRIMESTER: ICD-10-CM

## 2025-01-21 DIAGNOSIS — F17.200 SMOKER: ICD-10-CM

## 2025-01-21 DIAGNOSIS — O99.330 TOBACCO USE DISORDER AFFECTING PREGNANCY, ANTEPARTUM: ICD-10-CM

## 2025-01-21 PROBLEM — Z3A.32 32 WEEKS GESTATION OF PREGNANCY: Status: RESOLVED | Noted: 2024-09-30 | Resolved: 2025-01-21

## 2025-01-21 PROBLEM — Z3A.34 34 WEEKS GESTATION OF PREGNANCY: Status: RESOLVED | Noted: 2025-01-04 | Resolved: 2025-01-21

## 2025-01-21 LAB
GLUCOSE URINE, POC: NORMAL MG/DL
PROTEIN UA: NEGATIVE

## 2025-01-21 PROCEDURE — 99213 OFFICE O/P EST LOW 20 MIN: CPT | Performed by: OBSTETRICS & GYNECOLOGY

## 2025-01-21 PROCEDURE — 76818 FETAL BIOPHYS PROFILE W/NST: CPT | Performed by: OBSTETRICS & GYNECOLOGY

## 2025-01-21 PROCEDURE — 99999 PR OFFICE/OUTPT VISIT,PROCEDURE ONLY: CPT | Performed by: OBSTETRICS & GYNECOLOGY

## 2025-01-21 PROCEDURE — 81002 URINALYSIS NONAUTO W/O SCOPE: CPT | Performed by: OBSTETRICS & GYNECOLOGY

## 2025-01-21 PROCEDURE — 76816 OB US FOLLOW-UP PER FETUS: CPT | Performed by: OBSTETRICS & GYNECOLOGY

## 2025-01-21 PROCEDURE — 76820 UMBILICAL ARTERY ECHO: CPT | Performed by: OBSTETRICS & GYNECOLOGY

## 2025-01-21 NOTE — PROGRESS NOTES
Patient is here today for 4 wk f/u. Denies any vaginal bleeding, or leakage of fluids.  Lower pressure and cramping on and off.  Patient reports good fetal movement.     
end-diastolic flow in the samples evaluated.        OB History    Para Term  AB Living   5 2 1 1 2 2   SAB IAB Ectopic Molar Multiple Live Births   2       0 2      # Outcome Date GA Lbr Brayan/2nd Weight Sex Type Anes PTL Lv   5 Current            4  16 36w2d  2.381 kg (5 lb 4 oz) M CS-LTranv Spinal N FLORENCIA      Complications: Abruptio Placenta   3 Term 11 41w0d  4.678 kg (10 lb 5 oz) M CS-LTranv Spinal, EPI N FLORENCIA   2 SAB            1 SAB                Past Medical History:   Diagnosis Date    Abnormal Pap smear of cervix 2024    Acute cholecystitis 10/21/2020    Depression     Infections of genitourinary tract antepartum 10/19/2011    Liveborn by  2016    Liver disease      delivery 2016    Trauma        Past Surgical History:   Procedure Laterality Date     SECTION  2011     SECTION  2016    CHOLECYSTECTOMY, LAPAROSCOPIC N/A 10/22/2020    CHOLECYSTECTOMY LAPAROSCOPIC WITH IOC performed by Arleen Hairston MD at [facility] OR       No Known Allergies      Current Outpatient Medications:     Ferrous Sulfate (IRON HIGH-POTENCY) 325 MG TABS, Take 325 mg by mouth daily, Disp: , Rfl:     Prenatal Vit w/Yo-Etvrwurif-OI (PNV PO), Take 1 tablet by mouth daily, Disp: , Rfl:     Social History     Tobacco Use    Smoking status: Every Day     Current packs/day: 1.00     Average packs/day: 1 pack/day for 9.0 years (9.0 ttl pk-yrs)     Types: Cigarettes    Smokeless tobacco: Never   Substance Use Topics    Alcohol use: No     Vital signs:   /85   Pulse 91   Temp 97.3 °F (36.3 °C) (Temporal)   Resp 14   Wt 82.6 kg (182 lb)   LMP  (Approximate)   BMI 29.38 kg/m²   Urine dipstick:   Negative for Glucose    Negative for Albumin    IMPRESSION:  1.  IUP at 36 weeks 5 days Estimated Date of Delivery: 2025  2.  History of chronic hepatitis C  3.  Liver enzymes were within normal limits on 10/16/2024  4.  Estimated fetal weight was 2674 g

## 2025-01-21 NOTE — TELEPHONE ENCOUNTER
Spoke to Fiona and told her that Dr. Koch tried to call her but there must have been a bad conection. He was wondering if she has had any labs for her hepatitis drawn recently and she she said she has not. I told her that he will be putting in Hep lab orders to be run and an order for to talk to a neonatologist as well before delivery. She was okay with this and said that she see's Dr. Paulson on Thursday and will have him draw the labs at that time.

## 2025-01-27 ENCOUNTER — TELEPHONE (OUTPATIENT)
Dept: OBGYN CLINIC | Age: 40
End: 2025-01-27

## 2025-01-28 LAB
A/G RATIO: 1 (CALC) (ref 1–2.5)
ALBUMIN: 3.5 G/DL (ref 3.6–5.1)
ALP BLD-CCNC: 187 U/L (ref 31–125)
ALT SERPL-CCNC: 11 U/L (ref 6–29)
AST SERPL-CCNC: 16 U/L (ref 10–30)
BILIRUB SERPL-MCNC: 0.5 MG/DL (ref 0.2–1.2)
BUN / CREAT RATIO: ABNORMAL (CALC) (ref 6–22)
BUN BLDV-MCNC: 7 MG/DL (ref 7–25)
CALCIUM SERPL-MCNC: 8.9 MG/DL (ref 8.6–10.2)
CHLORIDE BLD-SCNC: 101 MMOL/L (ref 98–110)
CO2: 22 MMOL/L (ref 20–32)
CREAT SERPL-MCNC: 0.6 MG/DL (ref 0.5–0.97)
ESTIMATED GLOMERULAR FILTRATION RATE CREATININE EQUATION: 118 ML/MIN/1.73M2
GLOBULIN: 3.5 G/DL (CALC) (ref 1.9–3.7)
GLUCOSE BLD-MCNC: 78 MG/DL (ref 65–99)
HEPATITIS C RNA PCR QUANT: ABNORMAL IU/ML
HEPATITIS C RNA-PCR: 6.37 LOG IU/ML
POTASSIUM SERPL-SCNC: 4.4 MMOL/L (ref 3.5–5.3)
SODIUM BLD-SCNC: 135 MMOL/L (ref 135–146)
TOTAL PROTEIN: 7 G/DL (ref 6.1–8.1)

## 2025-02-05 ENCOUNTER — ANESTHESIA EVENT (OUTPATIENT)
Dept: LABOR AND DELIVERY | Age: 40
End: 2025-02-05
Payer: COMMERCIAL

## 2025-02-06 ENCOUNTER — ANESTHESIA (OUTPATIENT)
Dept: LABOR AND DELIVERY | Age: 40
End: 2025-02-06
Payer: COMMERCIAL

## 2025-02-06 ENCOUNTER — HOSPITAL ENCOUNTER (INPATIENT)
Age: 40
LOS: 2 days | Discharge: HOME | End: 2025-02-08
Attending: OBSTETRICS & GYNECOLOGY | Admitting: OBSTETRICS & GYNECOLOGY
Payer: COMMERCIAL

## 2025-02-06 DIAGNOSIS — G89.18 ACUTE POSTOPERATIVE PAIN: Primary | ICD-10-CM

## 2025-02-06 PROBLEM — Z3A.39 39 WEEKS GESTATION OF PREGNANCY: Status: ACTIVE | Noted: 2025-02-06

## 2025-02-06 LAB
ABO + RH BLD: NORMAL
AMPHET UR QL SCN: NEGATIVE
ARM BAND NUMBER: NORMAL
BARBITURATES UR QL SCN: NEGATIVE
BENZODIAZ UR QL: NEGATIVE
BLOOD BANK SAMPLE EXPIRATION: NORMAL
BLOOD GROUP ANTIBODIES SERPL: NEGATIVE
BUPRENORPHINE UR QL: NEGATIVE
CANNABINOIDS UR QL SCN: NEGATIVE
COCAINE UR QL SCN: NEGATIVE
ERYTHROCYTE [DISTWIDTH] IN BLOOD BY AUTOMATED COUNT: 17.8 % (ref 11.5–15)
FENTANYL UR QL: NEGATIVE
HCT VFR BLD AUTO: 40.2 % (ref 34–48)
HGB BLD-MCNC: 12.8 G/DL (ref 11.5–15.5)
MCH RBC QN AUTO: 26.3 PG (ref 26–35)
MCHC RBC AUTO-ENTMCNC: 31.8 G/DL (ref 32–34.5)
MCV RBC AUTO: 82.7 FL (ref 80–99.9)
METHADONE UR QL: NEGATIVE
OPIATES UR QL SCN: NEGATIVE
OXYCODONE UR QL SCN: NEGATIVE
PCP UR QL SCN: NEGATIVE
PLATELET # BLD AUTO: 311 K/UL (ref 130–450)
PMV BLD AUTO: 10.4 FL (ref 7–12)
RBC # BLD AUTO: 4.86 M/UL (ref 3.5–5.5)
TEST INFORMATION: NORMAL
WBC OTHER # BLD: 12 K/UL (ref 4.5–11.5)

## 2025-02-06 PROCEDURE — 6360000002 HC RX W HCPCS: Performed by: OBSTETRICS & GYNECOLOGY

## 2025-02-06 PROCEDURE — 2500000003 HC RX 250 WO HCPCS

## 2025-02-06 PROCEDURE — 7100000001 HC PACU RECOVERY - ADDTL 15 MIN: Performed by: OBSTETRICS & GYNECOLOGY

## 2025-02-06 PROCEDURE — 2709999900 HC NON-CHARGEABLE SUPPLY: Performed by: OBSTETRICS & GYNECOLOGY

## 2025-02-06 PROCEDURE — 2580000003 HC RX 258

## 2025-02-06 PROCEDURE — 2580000003 HC RX 258: Performed by: OBSTETRICS & GYNECOLOGY

## 2025-02-06 PROCEDURE — 1220000000 HC SEMI PRIVATE OB R&B

## 2025-02-06 PROCEDURE — 6360000002 HC RX W HCPCS: Performed by: ANESTHESIOLOGY

## 2025-02-06 PROCEDURE — 80307 DRUG TEST PRSMV CHEM ANLYZR: CPT

## 2025-02-06 PROCEDURE — 86901 BLOOD TYPING SEROLOGIC RH(D): CPT

## 2025-02-06 PROCEDURE — 86850 RBC ANTIBODY SCREEN: CPT

## 2025-02-06 PROCEDURE — 3700000000 HC ANESTHESIA ATTENDED CARE: Performed by: OBSTETRICS & GYNECOLOGY

## 2025-02-06 PROCEDURE — 7100000000 HC PACU RECOVERY - FIRST 15 MIN: Performed by: OBSTETRICS & GYNECOLOGY

## 2025-02-06 PROCEDURE — 85027 COMPLETE CBC AUTOMATED: CPT

## 2025-02-06 PROCEDURE — 86900 BLOOD TYPING SEROLOGIC ABO: CPT

## 2025-02-06 PROCEDURE — 6360000002 HC RX W HCPCS

## 2025-02-06 PROCEDURE — 6370000000 HC RX 637 (ALT 250 FOR IP): Performed by: OBSTETRICS & GYNECOLOGY

## 2025-02-06 PROCEDURE — 2500000003 HC RX 250 WO HCPCS: Performed by: OBSTETRICS & GYNECOLOGY

## 2025-02-06 PROCEDURE — 3609079900 HC CESAREAN SECTION: Performed by: OBSTETRICS & GYNECOLOGY

## 2025-02-06 RX ORDER — BUPIVACAINE HYDROCHLORIDE 7.5 MG/ML
INJECTION, SOLUTION INTRASPINAL
Status: COMPLETED | OUTPATIENT
Start: 2025-02-06 | End: 2025-02-06

## 2025-02-06 RX ORDER — OXYCODONE HYDROCHLORIDE 5 MG/1
5 TABLET ORAL EVERY 4 HOURS PRN
Status: DISCONTINUED | OUTPATIENT
Start: 2025-02-06 | End: 2025-02-08 | Stop reason: HOSPADM

## 2025-02-06 RX ORDER — SIMETHICONE 80 MG
80 TABLET,CHEWABLE ORAL EVERY 6 HOURS PRN
Status: DISCONTINUED | OUTPATIENT
Start: 2025-02-06 | End: 2025-02-08 | Stop reason: HOSPADM

## 2025-02-06 RX ORDER — SODIUM CHLORIDE 0.9 % (FLUSH) 0.9 %
5-40 SYRINGE (ML) INJECTION EVERY 12 HOURS SCHEDULED
Status: DISCONTINUED | OUTPATIENT
Start: 2025-02-06 | End: 2025-02-08 | Stop reason: HOSPADM

## 2025-02-06 RX ORDER — SODIUM CHLORIDE 9 MG/ML
INJECTION, SOLUTION INTRAVENOUS PRN
Status: DISCONTINUED | OUTPATIENT
Start: 2025-02-06 | End: 2025-02-08 | Stop reason: HOSPADM

## 2025-02-06 RX ORDER — METHYLERGONOVINE MALEATE 0.2 MG/ML
200 INJECTION INTRAVENOUS PRN
Status: DISCONTINUED | OUTPATIENT
Start: 2025-02-06 | End: 2025-02-08 | Stop reason: HOSPADM

## 2025-02-06 RX ORDER — ONDANSETRON 2 MG/ML
4 INJECTION INTRAMUSCULAR; INTRAVENOUS EVERY 6 HOURS PRN
Status: DISCONTINUED | OUTPATIENT
Start: 2025-02-06 | End: 2025-02-08 | Stop reason: HOSPADM

## 2025-02-06 RX ORDER — MORPHINE SULFATE 1 MG/ML
INJECTION, SOLUTION EPIDURAL; INTRATHECAL; INTRAVENOUS
Status: COMPLETED | OUTPATIENT
Start: 2025-02-06 | End: 2025-02-06

## 2025-02-06 RX ORDER — DIPHENHYDRAMINE HYDROCHLORIDE 50 MG/ML
25 INJECTION INTRAMUSCULAR; INTRAVENOUS EVERY 6 HOURS PRN
Status: ACTIVE | OUTPATIENT
Start: 2025-02-06 | End: 2025-02-07

## 2025-02-06 RX ORDER — ENOXAPARIN SODIUM 100 MG/ML
40 INJECTION SUBCUTANEOUS DAILY
Status: DISCONTINUED | OUTPATIENT
Start: 2025-02-06 | End: 2025-02-08 | Stop reason: HOSPADM

## 2025-02-06 RX ORDER — ONDANSETRON 4 MG/1
4 TABLET, ORALLY DISINTEGRATING ORAL EVERY 8 HOURS PRN
Status: DISCONTINUED | OUTPATIENT
Start: 2025-02-06 | End: 2025-02-08 | Stop reason: HOSPADM

## 2025-02-06 RX ORDER — CITRIC ACID/SODIUM CITRATE 334-500MG
30 SOLUTION, ORAL ORAL ONCE
Status: COMPLETED | OUTPATIENT
Start: 2025-02-06 | End: 2025-02-06

## 2025-02-06 RX ORDER — SODIUM CHLORIDE, SODIUM LACTATE, POTASSIUM CHLORIDE, CALCIUM CHLORIDE 600; 310; 30; 20 MG/100ML; MG/100ML; MG/100ML; MG/100ML
INJECTION, SOLUTION INTRAVENOUS CONTINUOUS
Status: DISCONTINUED | OUTPATIENT
Start: 2025-02-06 | End: 2025-02-08 | Stop reason: HOSPADM

## 2025-02-06 RX ORDER — FERROUS SULFATE 325(65) MG
325 TABLET ORAL 2 TIMES DAILY WITH MEALS
Status: DISCONTINUED | OUTPATIENT
Start: 2025-02-06 | End: 2025-02-08 | Stop reason: HOSPADM

## 2025-02-06 RX ORDER — SODIUM CHLORIDE 0.9 % (FLUSH) 0.9 %
5-40 SYRINGE (ML) INJECTION PRN
Status: DISCONTINUED | OUTPATIENT
Start: 2025-02-06 | End: 2025-02-08 | Stop reason: HOSPADM

## 2025-02-06 RX ORDER — DOCUSATE SODIUM 100 MG/1
100 CAPSULE, LIQUID FILLED ORAL 2 TIMES DAILY
Status: DISCONTINUED | OUTPATIENT
Start: 2025-02-06 | End: 2025-02-08 | Stop reason: HOSPADM

## 2025-02-06 RX ORDER — BISACODYL 10 MG
10 SUPPOSITORY, RECTAL RECTAL DAILY PRN
Status: DISCONTINUED | OUTPATIENT
Start: 2025-02-06 | End: 2025-02-08 | Stop reason: HOSPADM

## 2025-02-06 RX ORDER — ONDANSETRON 2 MG/ML
INJECTION INTRAMUSCULAR; INTRAVENOUS
Status: DISCONTINUED | OUTPATIENT
Start: 2025-02-06 | End: 2025-02-06 | Stop reason: SDUPTHER

## 2025-02-06 RX ORDER — SODIUM CHLORIDE, SODIUM LACTATE, POTASSIUM CHLORIDE, CALCIUM CHLORIDE 600; 310; 30; 20 MG/100ML; MG/100ML; MG/100ML; MG/100ML
INJECTION, SOLUTION INTRAVENOUS CONTINUOUS
Status: DISCONTINUED | OUTPATIENT
Start: 2025-02-06 | End: 2025-02-06

## 2025-02-06 RX ORDER — MODIFIED LANOLIN
OINTMENT (GRAM) TOPICAL
Status: DISCONTINUED | OUTPATIENT
Start: 2025-02-06 | End: 2025-02-08 | Stop reason: HOSPADM

## 2025-02-06 RX ORDER — DEXMEDETOMIDINE HYDROCHLORIDE 100 UG/ML
INJECTION, SOLUTION INTRAVENOUS
Status: DISCONTINUED | OUTPATIENT
Start: 2025-02-06 | End: 2025-02-06 | Stop reason: SDUPTHER

## 2025-02-06 RX ORDER — DIPHENHYDRAMINE HCL 25 MG
25 TABLET ORAL EVERY 6 HOURS PRN
Status: ACTIVE | OUTPATIENT
Start: 2025-02-06 | End: 2025-02-07

## 2025-02-06 RX ORDER — ONDANSETRON 2 MG/ML
4 INJECTION INTRAMUSCULAR; INTRAVENOUS EVERY 6 HOURS PRN
Status: DISCONTINUED | OUTPATIENT
Start: 2025-02-06 | End: 2025-02-06

## 2025-02-06 RX ORDER — OXYCODONE HYDROCHLORIDE 5 MG/1
5 TABLET ORAL EVERY 4 HOURS PRN
Status: ACTIVE | OUTPATIENT
Start: 2025-02-06 | End: 2025-02-07

## 2025-02-06 RX ORDER — IBUPROFEN 800 MG/1
800 TABLET, FILM COATED ORAL EVERY 8 HOURS
Status: DISCONTINUED | OUTPATIENT
Start: 2025-02-07 | End: 2025-02-08 | Stop reason: HOSPADM

## 2025-02-06 RX ORDER — DEXMEDETOMIDINE HYDROCHLORIDE 100 UG/ML
INJECTION, SOLUTION INTRAVENOUS
Status: COMPLETED
Start: 2025-02-06 | End: 2025-02-06

## 2025-02-06 RX ORDER — ACETAMINOPHEN 500 MG
1000 TABLET ORAL EVERY 8 HOURS SCHEDULED
Status: DISCONTINUED | OUTPATIENT
Start: 2025-02-06 | End: 2025-02-08 | Stop reason: HOSPADM

## 2025-02-06 RX ORDER — SODIUM CHLORIDE, SODIUM LACTATE, POTASSIUM CHLORIDE, AND CALCIUM CHLORIDE .6; .31; .03; .02 G/100ML; G/100ML; G/100ML; G/100ML
1000 INJECTION, SOLUTION INTRAVENOUS ONCE
Status: COMPLETED | OUTPATIENT
Start: 2025-02-06 | End: 2025-02-06

## 2025-02-06 RX ORDER — NALOXONE HYDROCHLORIDE 0.4 MG/ML
INJECTION, SOLUTION INTRAMUSCULAR; INTRAVENOUS; SUBCUTANEOUS PRN
Status: ACTIVE | OUTPATIENT
Start: 2025-02-06 | End: 2025-02-07

## 2025-02-06 RX ORDER — PHENYLEPHRINE HCL IN 0.9% NACL 1 MG/10 ML
SYRINGE (ML) INTRAVENOUS
Status: DISCONTINUED | OUTPATIENT
Start: 2025-02-06 | End: 2025-02-06 | Stop reason: SDUPTHER

## 2025-02-06 RX ORDER — OXYCODONE HYDROCHLORIDE 5 MG/1
10 TABLET ORAL EVERY 4 HOURS PRN
Status: DISPENSED | OUTPATIENT
Start: 2025-02-06 | End: 2025-02-07

## 2025-02-06 RX ORDER — SODIUM CHLORIDE, SODIUM LACTATE, POTASSIUM CHLORIDE, CALCIUM CHLORIDE 600; 310; 30; 20 MG/100ML; MG/100ML; MG/100ML; MG/100ML
INJECTION, SOLUTION INTRAVENOUS
Status: DISCONTINUED | OUTPATIENT
Start: 2025-02-06 | End: 2025-02-06 | Stop reason: SDUPTHER

## 2025-02-06 RX ORDER — KETOROLAC TROMETHAMINE 30 MG/ML
30 INJECTION, SOLUTION INTRAMUSCULAR; INTRAVENOUS EVERY 6 HOURS PRN
Status: DISPENSED | OUTPATIENT
Start: 2025-02-06 | End: 2025-02-07

## 2025-02-06 RX ORDER — KETOROLAC TROMETHAMINE 30 MG/ML
30 INJECTION, SOLUTION INTRAMUSCULAR; INTRAVENOUS EVERY 6 HOURS
Status: DISPENSED | OUTPATIENT
Start: 2025-02-06 | End: 2025-02-07

## 2025-02-06 RX ORDER — ACETAMINOPHEN 325 MG/1
650 TABLET ORAL EVERY 4 HOURS PRN
Status: ACTIVE | OUTPATIENT
Start: 2025-02-06 | End: 2025-02-07

## 2025-02-06 RX ADMIN — BUPIVACAINE HYDROCHLORIDE 12 MG: 7.5 INJECTION, SOLUTION SUBARACHNOID at 10:23

## 2025-02-06 RX ADMIN — Medication 100 MCG: at 10:28

## 2025-02-06 RX ADMIN — WATER 2000 MG: 1 INJECTION INTRAMUSCULAR; INTRAVENOUS; SUBCUTANEOUS at 10:07

## 2025-02-06 RX ADMIN — SODIUM CHLORIDE, POTASSIUM CHLORIDE, SODIUM LACTATE AND CALCIUM CHLORIDE: 600; 310; 30; 20 INJECTION, SOLUTION INTRAVENOUS at 11:18

## 2025-02-06 RX ADMIN — ONDANSETRON 8 MG: 2 INJECTION, SOLUTION INTRAMUSCULAR; INTRAVENOUS at 10:20

## 2025-02-06 RX ADMIN — SODIUM CHLORIDE, POTASSIUM CHLORIDE, SODIUM LACTATE AND CALCIUM CHLORIDE: 600; 310; 30; 20 INJECTION, SOLUTION INTRAVENOUS at 10:09

## 2025-02-06 RX ADMIN — DEXMEDETOMIDINE HCL 10 MCG: 100 INJECTION INTRAVENOUS at 10:20

## 2025-02-06 RX ADMIN — ONDANSETRON 4 MG: 2 INJECTION, SOLUTION INTRAMUSCULAR; INTRAVENOUS at 16:16

## 2025-02-06 RX ADMIN — Medication 909 ML/HR: at 10:48

## 2025-02-06 RX ADMIN — HYDROMORPHONE HYDROCHLORIDE 0.5 MG: 1 INJECTION, SOLUTION INTRAMUSCULAR; INTRAVENOUS; SUBCUTANEOUS at 12:27

## 2025-02-06 RX ADMIN — Medication 100 MCG: at 10:31

## 2025-02-06 RX ADMIN — SODIUM CHLORIDE, POTASSIUM CHLORIDE, SODIUM LACTATE AND CALCIUM CHLORIDE: 600; 310; 30; 20 INJECTION, SOLUTION INTRAVENOUS at 10:16

## 2025-02-06 RX ADMIN — SODIUM CHLORIDE, POTASSIUM CHLORIDE, SODIUM LACTATE AND CALCIUM CHLORIDE 1000 ML: 600; 310; 30; 20 INJECTION, SOLUTION INTRAVENOUS at 10:08

## 2025-02-06 RX ADMIN — MORPHINE SULFATE 0.15 MG: 1 INJECTION, SOLUTION EPIDURAL; INTRATHECAL; INTRAVENOUS at 10:23

## 2025-02-06 RX ADMIN — SODIUM CITRATE AND CITRIC ACID MONOHYDRATE 30 ML: 500; 334 SOLUTION ORAL at 10:07

## 2025-02-06 RX ADMIN — Medication 100 MCG: at 10:33

## 2025-02-06 RX ADMIN — KETOROLAC TROMETHAMINE 30 MG: 30 INJECTION, SOLUTION INTRAMUSCULAR at 19:22

## 2025-02-06 RX ADMIN — KETOROLAC TROMETHAMINE 30 MG: 30 INJECTION, SOLUTION INTRAMUSCULAR at 12:25

## 2025-02-06 ASSESSMENT — PAIN - FUNCTIONAL ASSESSMENT: PAIN_FUNCTIONAL_ASSESSMENT: ACTIVITIES ARE NOT PREVENTED

## 2025-02-06 ASSESSMENT — PAIN SCALES - GENERAL
PAINLEVEL_OUTOF10: 6

## 2025-02-06 ASSESSMENT — PAIN DESCRIPTION - LOCATION
LOCATION: ABDOMEN;INCISION
LOCATION: ABDOMEN
LOCATION: ABDOMEN

## 2025-02-06 ASSESSMENT — PAIN DESCRIPTION - DESCRIPTORS: DESCRIPTORS: DISCOMFORT;SORE;CRAMPING

## 2025-02-06 ASSESSMENT — PAIN DESCRIPTION - ORIENTATION: ORIENTATION: LOWER;ANTERIOR

## 2025-02-06 ASSESSMENT — LIFESTYLE VARIABLES: SMOKING_STATUS: 1

## 2025-02-06 NOTE — ANESTHESIA PRE PROCEDURE
Department of Anesthesiology  Preprocedure Note       Name:  Fiona FONG Quan   Age:  39 y.o.  :  1985                                          MRN:  [id number]         Date:  2025      Surgeon: Surgeon(s):  Anila Paulson MD    Procedure: Procedure(s):   SECTION    Medications prior to admission:   Prior to Admission medications    Medication Sig Start Date End Date Taking? Authorizing Provider   Ferrous Sulfate (IRON HIGH-POTENCY) 325 MG TABS Take 325 mg by mouth daily 24  ProviderJv MD   Prenatal Vit w/Rv-Qspzsinqj-NU (PNV PO) Take 1 tablet by mouth daily    Jv Barajas MD       Current medications:    Current Facility-Administered Medications   Medication Dose Route Frequency Provider Last Rate Last Admin    lactated ringers infusion   IntraVENous Continuous Anila Paulson MD        lactated ringers bolus 1,000 mL  1,000 mL IntraVENous Once Anila Paulson MD        oxytocin (PITOCIN) 30 units in 500 mL infusion  87.3 charly-units/min IntraVENous Continuous PRN Anila Paulson MD        And    oxytocin (PITOCIN) 10 unit bolus from the bag  10 Units IntraVENous PRN Anila Paulson MD        ondansetron (ZOFRAN) injection 4 mg  4 mg IntraVENous Q6H PRN Anila Paulson MD        citric acid-sodium citrate (BICITRA) solution 30 mL  30 mL Oral Once Anila Paulson MD        ceFAZolin (ANCEF) 2,000 mg in sterile water 20 mL IV syringe  2,000 mg IntraVENous Once Anila Paulson MD           Allergies:  No Known Allergies    Problem List:    Patient Active Problem List   Diagnosis Code    Multigravida of advanced maternal age in third trimester O09.523    Fifth pregnancy Z34.90    Hx of  delivery, currently pregnant, third trimester O09.893    Hx of  section Z98.891    Chronic hepatitis C without hepatic coma (HCC) B18.2    Smoker F17.200    History of placenta abruption Z87.59    Tobacco use disorder affecting pregnancy, antepartum O99.330    39 weeks gestation of pregnancy Z3A.39

## 2025-02-06 NOTE — OP NOTE
Operative Note      Patient: Fiona Glass  YOB: 1985  MRN: [id number]    Date of Procedure: 2025    Pre-Op Diagnosis Codes:      * S/P repeat low transverse  [Z98.891]    Post-Op Diagnosis: {MH OR SAME:196744364}       Procedure(s):   SECTION    Surgeon(s):  Anila Paulson MD    Assistant:   * No surgical staff found *    Anesthesia: Spinal    Estimated Blood Loss (mL): {NUMBERS; EBL:92530}    Complications: {Symptoms; Intra-op complications:00908}    Specimens:   * No specimens in log *    Implants:  * No implants in log *      Drains: * No LDAs found *    Findings:  Infection Present At Time Of Surgery (PATOS) (choose all levels that have infection present):  {PATOS LEVELS:175747016}  Other Findings: ***    Detailed Description of Procedure:   ***    Electronically signed by Anila Paulson MD on 2025 at 11:27 AM

## 2025-02-06 NOTE — H&P
CHIEF COMPLAINT:   elective CS      HISTORY OF PRESENT ILLNESS:    The patient is a 39 y.o. female , No LMP recorded (approximate). Patient is pregnant.,  at 39w0d.    OB History          5    Para   2    Term   1       1    AB   2    Living   2         SAB   2    IAB        Ectopic        Molar        Multiple   0    Live Births   2            Patient presents with a chief complaint as above and is being admitted for evaluation    Estimated Due Date: Estimated Date of Delivery: 25    PRENATAL CARE:  Complicated by:   Patient Active Problem List   Diagnosis Code    Multigravida of advanced maternal age in third trimester O09.523    Fifth pregnancy Z34.90    Hx of  delivery, currently pregnant, third trimester O09.893    Hx of  section Z98.891    Chronic hepatitis C without hepatic coma (HCC) B18.2    Smoker F17.200    History of placenta abruption Z87.59    Tobacco use disorder affecting pregnancy, antepartum O99.330    39 weeks gestation of pregnancy Z3A.39       PAST OB HISTORY  OB History          5    Para   2    Term   1       1    AB   2    Living   2         SAB   2    IAB        Ectopic        Molar        Multiple   0    Live Births   2                Past Medical History:        Diagnosis Date    Abnormal Pap smear of cervix 2024    Acute cholecystitis 10/21/2020    Depression     Infections of genitourinary tract antepartum 10/19/2011    Liveborn by  2016    Liver disease      delivery 2016    Trauma        Past Surgical History:        Procedure Laterality Date     SECTION  2011     SECTION  2016    CHOLECYSTECTOMY, LAPAROSCOPIC N/A 10/22/2020    CHOLECYSTECTOMY LAPAROSCOPIC WITH IOC performed by Arleen Hairston MD at [facility] OR       Social History:    TOBACCO:   reports that she has been smoking cigarettes. She has a 9 pack-year smoking history. She has never used smokeless tobacco.  ETOH:   reports  05:19 AM     2025 12:00 AM    CO2 22 2025 12:00 AM    BUN 7 2025 12:00 AM     U/A:  No components found for: \"UCOLOR\", \"UCLARITY\", \"USPGRAV\", \"UPH\", \"UPROTEIN\", \"UGLUCOSE\", \"UKETONE\", \"UBILI\", \"UBLOOD\", \"UNITRITE\", \"UUROBIL\", \"ULEUKEST\", \"USQEPI\", \"URENEPI\", \"UWBC\", \"URBC\", \"UBACTERIA\", \"UHYALINE\"    ASSESSMENT AND PLAN    Elective repeat  Section scheduled.  Indication: previous CS delivery

## 2025-02-06 NOTE — PROGRESS NOTES
Cecille NUNEZ and Dr. Nelson asked for H&P on patient, could not at this time. Stated that Dr. Paulson can do his own H&P when he arrives.

## 2025-02-06 NOTE — ANESTHESIA PROCEDURE NOTES
Spinal Block    Patient location during procedure: procedural area  End time: 2/6/2025 10:23 AM  Reason for block: primary anesthetic  Staffing  Performed: other anesthesia staff   Anesthesiologist: Sandra Lewis DO  Resident/CRNA: Bandar Gonsales APRN - CRNA  Other anesthesia staff: Milton Davis RN  Performed by: Milton Davis RN  Authorized by: Sandra Lewis DO    Spinal Block  Patient position: sitting  Prep: ChloraPrep  Patient monitoring: continuous pulse ox, frequent blood pressure checks and cardiac monitor  Approach: midline  Location: L3/L4  Guidance: Alda Technique  Provider prep: sterile gloves and mask  Local infiltration: lidocaine  Needle  Needle type: Pencan   Needle gauge: 25 G  Needle length: 3.5 in  Assessment  Sensory level: T6  Swirl obtained: Yes  CSF: clear  Attempts: 1  Hemodynamics: stable  Preanesthetic Checklist  Completed: patient identified, IV checked, site marked, risks and benefits discussed, surgical/procedural consents, equipment checked, pre-op evaluation, timeout performed, anesthesia consent given, oxygen available, monitors applied/VS acknowledged, fire risk safety assessment completed and verbalized and blood product R/B/A discussed and consented

## 2025-02-06 NOTE — PROGRESS NOTES
[place] Hearing screening results were discussed with parent. Questions answered. Brochure given to parent. Advised to monitor developmental milestones and contact physician for any concerns.   Ana Solo

## 2025-02-07 LAB
ERYTHROCYTE [DISTWIDTH] IN BLOOD BY AUTOMATED COUNT: 17.6 % (ref 11.5–15)
HCT VFR BLD AUTO: 33.3 % (ref 34–48)
HGB BLD-MCNC: 10.2 G/DL (ref 11.5–15.5)
MCH RBC QN AUTO: 25.7 PG (ref 26–35)
MCHC RBC AUTO-ENTMCNC: 30.6 G/DL (ref 32–34.5)
MCV RBC AUTO: 83.9 FL (ref 80–99.9)
PLATELET # BLD AUTO: 211 K/UL (ref 130–450)
PMV BLD AUTO: 10.1 FL (ref 7–12)
RBC # BLD AUTO: 3.97 M/UL (ref 3.5–5.5)
WBC OTHER # BLD: 9.4 K/UL (ref 4.5–11.5)

## 2025-02-07 PROCEDURE — 1220000000 HC SEMI PRIVATE OB R&B

## 2025-02-07 PROCEDURE — 6370000000 HC RX 637 (ALT 250 FOR IP): Performed by: ANESTHESIOLOGY

## 2025-02-07 PROCEDURE — 6370000000 HC RX 637 (ALT 250 FOR IP): Performed by: OBSTETRICS & GYNECOLOGY

## 2025-02-07 PROCEDURE — 6360000002 HC RX W HCPCS: Performed by: OBSTETRICS & GYNECOLOGY

## 2025-02-07 PROCEDURE — 85027 COMPLETE CBC AUTOMATED: CPT

## 2025-02-07 RX ADMIN — IBUPROFEN 800 MG: 800 TABLET, FILM COATED ORAL at 17:28

## 2025-02-07 RX ADMIN — DOCUSATE SODIUM 100 MG: 100 CAPSULE, LIQUID FILLED ORAL at 19:47

## 2025-02-07 RX ADMIN — ACETAMINOPHEN 1000 MG: 500 TABLET ORAL at 19:47

## 2025-02-07 RX ADMIN — OXYCODONE 5 MG: 5 TABLET ORAL at 21:01

## 2025-02-07 RX ADMIN — SIMETHICONE 80 MG: 80 TABLET, CHEWABLE ORAL at 08:08

## 2025-02-07 RX ADMIN — KETOROLAC TROMETHAMINE 30 MG: 30 INJECTION, SOLUTION INTRAMUSCULAR at 08:08

## 2025-02-07 RX ADMIN — OXYCODONE 5 MG: 5 TABLET ORAL at 17:28

## 2025-02-07 RX ADMIN — SIMETHICONE 80 MG: 80 TABLET, CHEWABLE ORAL at 19:47

## 2025-02-07 RX ADMIN — ENOXAPARIN SODIUM 40 MG: 100 INJECTION SUBCUTANEOUS at 08:08

## 2025-02-07 RX ADMIN — OXYCODONE 5 MG: 5 TABLET ORAL at 06:21

## 2025-02-07 RX ADMIN — KETOROLAC TROMETHAMINE 30 MG: 30 INJECTION, SOLUTION INTRAMUSCULAR at 02:34

## 2025-02-07 RX ADMIN — OXYCODONE 10 MG: 5 TABLET ORAL at 11:35

## 2025-02-07 RX ADMIN — DOCUSATE SODIUM 100 MG: 100 CAPSULE, LIQUID FILLED ORAL at 08:08

## 2025-02-07 RX ADMIN — ACETAMINOPHEN 1000 MG: 500 TABLET ORAL at 11:34

## 2025-02-07 ASSESSMENT — PAIN DESCRIPTION - DESCRIPTORS
DESCRIPTORS: BURNING;SHARP
DESCRIPTORS: CRAMPING;BURNING;SHARP
DESCRIPTORS: ACHING;DISCOMFORT;SORE
DESCRIPTORS: ACHING;DISCOMFORT;SORE

## 2025-02-07 ASSESSMENT — PAIN SCALES - GENERAL
PAINLEVEL_OUTOF10: 8
PAINLEVEL_OUTOF10: 3
PAINLEVEL_OUTOF10: 6
PAINLEVEL_OUTOF10: 8

## 2025-02-07 ASSESSMENT — PAIN - FUNCTIONAL ASSESSMENT
PAIN_FUNCTIONAL_ASSESSMENT: ACTIVITIES ARE NOT PREVENTED

## 2025-02-07 ASSESSMENT — PAIN DESCRIPTION - ORIENTATION
ORIENTATION: LOWER

## 2025-02-07 ASSESSMENT — PAIN DESCRIPTION - LOCATION
LOCATION: ABDOMEN;INCISION
LOCATION: INCISION
LOCATION: ABDOMEN;INCISION
LOCATION: INCISION

## 2025-02-07 NOTE — PROGRESS NOTES
Patient john output 700mL. Patient dangled and stood at bedside, denied dizziness. Patient ambulated to the bathroom independently, tolerated well. John catheter removed, patient could not void at this time. DTV [id number]. Bleeding small, no clots. Quin care demonstration given. Pads, underwear, and gown changed. Patient ambulated back to bed. Tolerated well.

## 2025-02-07 NOTE — PROGRESS NOTES
Spiritual Health History and Assessment/Progress Note  [facility]    Initial Encounter, Rituals, Rites and Sacraments,  ,  ,      Name: Fiona Glass MRN: [id number]    Age: 39 y.o.     Sex: female   Language: English   Mu-ism: None   39 weeks gestation of pregnancy     Date: 2/7/2025                           Spiritual Assessment began in [facility] 3W MOM BABY        Referral/Consult From: Rounding   Encounter Overview/Reason: Initial Encounter, Rituals, Rites and Sacraments  Service Provided For: Patient    Zahida, Belief, Meaning:   Patient identifies as spiritual, is connected with a zahida tradition or spiritual practice, and has beliefs or practices that help with coping during difficult times  Family/Friends No family/friends present      Importance and Influence:  Patient has spiritual/personal beliefs that influence decisions regarding their health  Family/Friends No family/friends present    Community:  Patient feels well-supported. Support system includes: Children  Family/Friends No family/friends present    Assessment and Plan of Care:     Patient Interventions include: Facilitated expression of thoughts and feelings and Provided sacramental/Church ritual  Family/Friends Interventions include: No family/friends present    Patient Plan of Care: Spiritual Care available upon further referral  Family/Friends Plan of Care: Spiritual Care available upon further referral    Electronically signed by LOYD Bhagat on 2/7/2025 at 2:48 PM

## 2025-02-07 NOTE — PLAN OF CARE
Problem: Vaginal Birth or  Section  Goal: Fetal and maternal status remain reassuring during the birth process  Description:  Birth OB-Pregnancy care plan goal which identifies if the fetal and maternal status remain reassuring during the birth process  2025 by Alena Winston RN  Outcome: Progressing     Problem: Postpartum  Goal: Experiences normal postpartum course  Description:  Postpartum OB-Pregnancy care plan goal which identifies if the mother is experiencing a normal postpartum course  2025 by Alena Winston RN  Outcome: Progressing     Problem: Postpartum  Goal: Appropriate maternal -  bonding  Description:  Postpartum OB-Pregnancy care plan goal which identifies if the mother and  are bonding appropriately  2025 by Alena Winston RN  Outcome: Progressing     Problem: Postpartum  Goal: Establishment of infant feeding pattern  Description:  Postpartum OB-Pregnancy care plan goal which identifies if the mother is establishing a feeding pattern with their   2025 by Alena Winston RN  Outcome: Progressing     Problem: Postpartum  Goal: Incisions, wounds, or drain sites healing without S/S of infection  2025 by Alena Winston RN  Outcome: Progressing  Flowsheets (Taken 2025 1800)  Incisions, Wounds, or Drain Sites Healing Without Sign and Symptoms of Infection: TWICE DAILY: Assess and document dressing/incision, wound bed, drain sites and surrounding tissue     Problem: Pain  Goal: Verbalizes/displays adequate comfort level or baseline comfort level  2025 by Alena Winston RN  Outcome: Progressing  Flowsheets (Taken 2025 1800)  Verbalizes/displays adequate comfort level or baseline comfort level:   Encourage patient to monitor pain and request assistance   Assess pain using appropriate pain scale   Administer analgesics based on type and severity of pain and evaluate response   Implement non-pharmacological  measures as appropriate and evaluate response   Notify Licensed Independent Practitioner if interventions unsuccessful or patient reports new pain   Consider cultural and social influences on pain and pain management     Problem: Infection - Adult  Goal: Absence of infection at discharge  2/6/2025 1918 by Alena Winston RN  Outcome: Progressing     Problem: Infection - Adult  Goal: Absence of infection during hospitalization  2/6/2025 1918 by Alena Winston RN  Outcome: Progressing     Problem: Infection - Adult  Goal: Absence of fever/infection during anticipated neutropenic period  2/6/2025 1918 by Alena Winston RN  Outcome: Progressing     Problem: Safety - Adult  Goal: Free from fall injury  2/6/2025 1918 by Alena Winston RN  Outcome: Progressing     Problem: Discharge Planning  Goal: Discharge to home or other facility with appropriate resources  2/6/2025 1918 by Alena Winston RN  Outcome: Progressing     Problem: Chronic Conditions and Co-morbidities  Goal: Patient's chronic conditions and co-morbidity symptoms are monitored and maintained or improved  2/6/2025 1918 by Alena Winston RN  Outcome: Progressing     Problem: ABCDS Injury Assessment  Goal: Absence of physical injury  Outcome: Progressing

## 2025-02-07 NOTE — PLAN OF CARE
Problem: Postpartum  Goal: Experiences normal postpartum course  Description:  Postpartum OB-Pregnancy care plan goal which identifies if the mother is experiencing a normal postpartum course  2025 by Manuel Velasquez RN  Outcome: Progressing     Problem: Postpartum  Goal: Appropriate maternal -  bonding  Description:  Postpartum OB-Pregnancy care plan goal which identifies if the mother and  are bonding appropriately  2025 by Manuel Velasquez RN  Outcome: Progressing     Problem: Postpartum  Goal: Establishment of infant feeding pattern  Description:  Postpartum OB-Pregnancy care plan goal which identifies if the mother is establishing a feeding pattern with their   2025 by Manuel Velasquez, RN  Outcome: Progressing     Problem: Pain  Goal: Verbalizes/displays adequate comfort level or baseline comfort level  2025 by Manuel Velasquez RN  Outcome: Progressing

## 2025-02-08 VITALS
DIASTOLIC BLOOD PRESSURE: 76 MMHG | HEART RATE: 79 BPM | TEMPERATURE: 97.7 F | RESPIRATION RATE: 16 BRPM | OXYGEN SATURATION: 97 % | SYSTOLIC BLOOD PRESSURE: 125 MMHG

## 2025-02-08 PROCEDURE — 6360000002 HC RX W HCPCS: Performed by: OBSTETRICS & GYNECOLOGY

## 2025-02-08 PROCEDURE — 6370000000 HC RX 637 (ALT 250 FOR IP): Performed by: OBSTETRICS & GYNECOLOGY

## 2025-02-08 RX ORDER — IBUPROFEN 800 MG/1
800 TABLET, FILM COATED ORAL EVERY 8 HOURS PRN
Qty: 20 TABLET | Refills: 1 | Status: SHIPPED | OUTPATIENT
Start: 2025-02-08

## 2025-02-08 RX ORDER — OXYCODONE HYDROCHLORIDE 5 MG/1
5 TABLET ORAL EVERY 6 HOURS PRN
Qty: 12 TABLET | Refills: 0 | Status: SHIPPED | OUTPATIENT
Start: 2025-02-08 | End: 2025-02-11

## 2025-02-08 RX ADMIN — ACETAMINOPHEN 1000 MG: 500 TABLET ORAL at 08:03

## 2025-02-08 RX ADMIN — SIMETHICONE 80 MG: 80 TABLET, CHEWABLE ORAL at 08:04

## 2025-02-08 RX ADMIN — IBUPROFEN 800 MG: 800 TABLET, FILM COATED ORAL at 12:34

## 2025-02-08 RX ADMIN — OXYCODONE 5 MG: 5 TABLET ORAL at 08:03

## 2025-02-08 RX ADMIN — DOCUSATE SODIUM 100 MG: 100 CAPSULE, LIQUID FILLED ORAL at 08:03

## 2025-02-08 RX ADMIN — OXYCODONE 5 MG: 5 TABLET ORAL at 02:54

## 2025-02-08 RX ADMIN — ENOXAPARIN SODIUM 40 MG: 100 INJECTION SUBCUTANEOUS at 08:03

## 2025-02-08 RX ADMIN — IBUPROFEN 800 MG: 800 TABLET, FILM COATED ORAL at 00:15

## 2025-02-08 RX ADMIN — OXYCODONE 5 MG: 5 TABLET ORAL at 12:34

## 2025-02-08 ASSESSMENT — PAIN DESCRIPTION - DESCRIPTORS
DESCRIPTORS: BURNING;CRAMPING;SHARP
DESCRIPTORS: BURNING;CRAMPING;SHARP

## 2025-02-08 ASSESSMENT — PAIN DESCRIPTION - LOCATION
LOCATION: INCISION
LOCATION: INCISION

## 2025-02-08 ASSESSMENT — PAIN - FUNCTIONAL ASSESSMENT
PAIN_FUNCTIONAL_ASSESSMENT: ACTIVITIES ARE NOT PREVENTED
PAIN_FUNCTIONAL_ASSESSMENT: ACTIVITIES ARE NOT PREVENTED

## 2025-02-08 ASSESSMENT — PAIN SCALES - GENERAL
PAINLEVEL_OUTOF10: 8
PAINLEVEL_OUTOF10: 8
PAINLEVEL_OUTOF10: 6
PAINLEVEL_OUTOF10: 9

## 2025-02-08 ASSESSMENT — PAIN DESCRIPTION - ORIENTATION
ORIENTATION: LOWER
ORIENTATION: LOWER

## 2025-02-08 NOTE — PROGRESS NOTES
Subjective:     Postpartum Day {numbers 0-3:76896}:  Delivery    The patient feels {feelings:28674}. The patient {has/denies:5300} emotional concerns. Pain is {well/moderately/poorly:40496} controlled with current medications. The {baby is/babies are:67958}{well/ill:98497}. Baby is feeding via {BREAST/BOTTLE:28383::\"bottle\"} . Urinary output is {adequate/inadequate:09411}. The patient {is/not default is:00355} ambulating well. The patient {is/not default is:40730} tolerating a normal diet. Flatus {has/denies:5300} been passed.    Objective:    {VITALS:056220257}    Vitals:    25 0805   BP: 125/76   Pulse: 79   Resp: 16   Temp: 97.7 °F (36.5 °C)   SpO2: 97%         General:    {appear:00245::\"alert\",\"cooperative\",\"no distress\"}   Bowel Sounds:  {bowel sounds:48124}   Lochia:  {appropriate/inappropriate:98295::\"appropriate\"}   Uterine Fundus:   {firm/soft:02382}   Incision:  {INCISION:58272::\"healing well\",\"no significant drainage\",\"no dehiscence\",\"no significant erythema\"}   DVT Evaluation:  {DVT eval:40671::\"No evidence of DVT seen on physical exam.\"}     CBC   Lab Results   Component Value Date    WBC 9.4 2025    HGB 10.2 (L) 2025    HCT 33.3 (L) 2025    MCV 83.9 2025     2025        Assessment:     Status post  section. {Condition:55934::\"Doing well postoperatively.\"}     Plan:     {continue current care/discharge:00197}.

## 2025-02-08 NOTE — DISCHARGE INSTRUCTIONS
Follow-up with your OB doctor in 1 week if  delivery or in  6 weeks for vaginal delivery unless otherwise instructed.   Call office for an appointment.    For breastfeeding support, you can contact our lactation specialists at [phone] or [phone]    DIET  Eat a well balanced diet focusing on foods high in fiber and protein  Drink plenty of fluids especially water.  To avoid constipation you may take a mild stool softener as recommended by your doctor or midwife.    ACTIVITY  Gradually increase your activity.  Resume exercise regimen only after advised by your doctor or midwife.  Avoid lifting anything heavier than your baby or a gallon of milk for SIX weeks.   Avoid driving until your doctor or midwife has given their approval.  Rise slowly from a lying to sitting and then a standing position.  Climb stairs one at a time.  Use caution when carrying your baby up and down the stairs.  No sexual activity for 6 weeks or until advised by your doctor - Nothing in vagina: intercourse, tampons, or douching.   Be prepared to discuss family planning at your follow-up OB visit.   You may feel tired or have a lack of energy.  You may continue your prenatal vitamin to replenish nutrients post delivery.  Nap when baby naps to catch up on sleep.  May return to work or school in 6 weeks or as directed by OB.     EMOTIONS  You may feed lopez, sad, teary, & overwhelmed.  Contact your OB provider if you feel you may be showing signs of postpartum depression, or have thoughts of harming yourself or your infant.  If infant will not stop crying, contact another adult for help or place infant in their crib on their back and take a break.  NEVER shake your infant.      BLEEDING  Vaginal bleeding will decrease in amount over the next few weeks.  You will notice that as your activity increases, your flow may increase.  This is your body's way of telling you, you need to take things easier and rest more often.  Call your  weakness or dizziness.   If you are having flu-like symptoms such as achy muscles or joints.  There is a foul smell or a green color to your vaginal bleeding.  If you have pain that cannot be relieved.  You have persistent burning with urination or frequency.   Call if you have concerns about your well-being.  You are unable to sleep, eat, or are having thoughts of harming yourself or your baby.   You have swelling, bleeding, drainage, foul odor, redness, or warmth in/around your incision or stitches.  You have a red, warm, tender area in you calf.

## 2025-02-08 NOTE — PROGRESS NOTES
Subjective:     Postpartum Day 2:  Delivery    The patient feels tired. The patient denies emotional concerns. Pain is moderately controlled with current medications. The baby iswell. Baby is feeding via bottle . Urinary output is adequate. The patient is ambulating well. The patient is tolerating a normal diet. Flatus has been passed.    Objective:    VITALS:  /76   Pulse 79   Temp 97.7 °F (36.5 °C) (Oral)   Resp 16   LMP  (Approximate)   SpO2 97%   Breastfeeding Unknown     Vitals:    25 0805   BP: 125/76   Pulse: 79   Resp: 16   Temp: 97.7 °F (36.5 °C)   SpO2: 97%         General:    alert, cooperative, and no distress   Bowel Sounds:  active   Lochia:  appropriate   Uterine Fundus:   firm   Incision:  no significant drainage, no dehiscence   DVT Evaluation:  No evidence of DVT seen on physical exam.     CBC   Lab Results   Component Value Date    WBC 9.4 2025    HGB 10.2 (L) 2025    HCT 33.3 (L) 2025    MCV 83.9 2025     2025        Assessment:     Status post  section. Doing well postoperatively.     Plan:     Discharge home with standard precautions and return to clinic in 1 weeks.

## 2025-02-08 NOTE — ANESTHESIA POSTPROCEDURE EVALUATION
Department of Anesthesiology  Postprocedure Note    Patient: Fiona Glass  MRN: [id number]  YOB: 1985  Date of evaluation: 2025    Procedure Summary       Date: 25 Room / Location: [address]    Anesthesia Start: 1016 Anesthesia Stop: 1135    Procedure:  SECTION (Uterus) Diagnosis:       S/P repeat low transverse       (S/P repeat low transverse  [Z98.891])    Surgeons: Anila Paulson MD Responsible Provider: Sandra Lewis DO    Anesthesia Type: Spinal ASA Status: 3            Anesthesia Type: Spinal    José Luis Phase I: José Luis Score: 9    José Luis Phase II: José Luis Score: 10    Anesthesia Post Evaluation    Patient location during evaluation: bedside  Patient participation: complete - patient participated  Level of consciousness: awake and alert  Airway patency: patent  Nausea & Vomiting: no nausea and no vomiting  Cardiovascular status: hemodynamically stable  Respiratory status: acceptable  Hydration status: euvolemic  Comments: Patient satisfied  Pain management: adequate        No notable events documented.

## 2025-02-12 NOTE — DISCHARGE SUMMARY
Obstetrical Discharge Form    Gestational Age:  39w0d    Antepartum complications: chronic active hepatitis C    Date of Delivery:   2025      Type of Delivery:    for previous CS delivery    Delivered By:    Anila Paulson MD             Baby:       Information for the patient's :  Colin Glass [[id number]]      Anesthesia:    Spinal    Intrapartum complications: None    Feeding method:   bottle    Postpartum complications: none    Discharge Date:   2025     Discharged to:                      Home    Condition at discharge:       Stable  Plan:   Follow up    in 1 week(s)

## (undated) DEVICE — SUTURE CHROMIC GUT SZ 2-0 L36IN ABSRB BRN L36MM CT-1 1/2 923H

## (undated) DEVICE — SUTURE VICRYL + 0 L27IN ABSRB VLT CTX L48MM 1/2 CIR VCP364H

## (undated) DEVICE — SOLUTION IRRIG 500ML 0.9% SOD CHLO USP POUR PLAS BTL

## (undated) DEVICE — STAPLER SKIN SQ 30 ABSRB STPL DISP INSORB ORDER VIA PHONE OR EMAIL

## (undated) DEVICE — 34" SINGLE PATIENT USE HOVERMATT BREATHABLE: Brand: SINGLE PATIENT USE HOVERMATT

## (undated) DEVICE — 2134367

## (undated) DEVICE — SUTURE STRATAFIX SYMMETRIC PDS + SZ 1 L18IN ABSRB VLT L48MM SXPP1A400

## (undated) DEVICE — 4-PORT MANIFOLD: Brand: NEPTUNE 2

## (undated) DEVICE — GLOVE SURG SZ 65 THK91MIL LTX FREE SYN POLYISOPRENE

## (undated) DEVICE — CESAREAN BIRTH PACK: Brand: MEDLINE INDUSTRIES, INC.

## (undated) DEVICE — SYRINGE MED 10ML LUERLOCK TIP W/O SFTY DISP